# Patient Record
Sex: MALE | Race: WHITE | Employment: UNEMPLOYED | URBAN - METROPOLITAN AREA
[De-identification: names, ages, dates, MRNs, and addresses within clinical notes are randomized per-mention and may not be internally consistent; named-entity substitution may affect disease eponyms.]

---

## 2021-09-01 ENCOUNTER — HOSPITAL ENCOUNTER (INPATIENT)
Age: 21
LOS: 4 days | Discharge: HOME | DRG: 754 | End: 2021-09-05
Attending: EMERGENCY MEDICINE | Admitting: PSYCHIATRY & NEUROLOGY
Payer: MEDICAID

## 2021-09-01 DIAGNOSIS — F32.A DEPRESSION, UNSPECIFIED DEPRESSION TYPE: Primary | ICD-10-CM

## 2021-09-01 PROBLEM — R45.851 DEPRESSION WITH SUICIDAL IDEATION: Status: ACTIVE | Noted: 2021-09-01

## 2021-09-01 LAB
ACETAMINOPHEN LEVEL: <5 MCG/ML (ref 10–30)
ALBUMIN SERPL-MCNC: 4.8 G/DL (ref 3.5–5.2)
ALP BLD-CCNC: 83 U/L (ref 40–129)
ALT SERPL-CCNC: 20 U/L (ref 0–40)
AMPHETAMINE SCREEN, URINE: NOT DETECTED
ANION GAP SERPL CALCULATED.3IONS-SCNC: 10 MMOL/L (ref 7–16)
AST SERPL-CCNC: 16 U/L (ref 0–39)
BARBITURATE SCREEN URINE: NOT DETECTED
BASOPHILS ABSOLUTE: 0.02 E9/L (ref 0–0.2)
BASOPHILS RELATIVE PERCENT: 0.2 % (ref 0–2)
BENZODIAZEPINE SCREEN, URINE: NOT DETECTED
BILIRUB SERPL-MCNC: 0.6 MG/DL (ref 0–1.2)
BUN BLDV-MCNC: 9 MG/DL (ref 6–20)
CALCIUM SERPL-MCNC: 9.8 MG/DL (ref 8.6–10.2)
CANNABINOID SCREEN URINE: POSITIVE
CHLORIDE BLD-SCNC: 103 MMOL/L (ref 98–107)
CO2: 27 MMOL/L (ref 22–29)
COCAINE METABOLITE SCREEN URINE: NOT DETECTED
CREAT SERPL-MCNC: 1.1 MG/DL (ref 0.7–1.2)
EOSINOPHILS ABSOLUTE: 0.05 E9/L (ref 0.05–0.5)
EOSINOPHILS RELATIVE PERCENT: 0.5 % (ref 0–6)
ETHANOL: <10 MG/DL (ref 0–0.08)
FENTANYL SCREEN, URINE: NOT DETECTED
GFR AFRICAN AMERICAN: >60
GFR NON-AFRICAN AMERICAN: >60 ML/MIN/1.73
GLUCOSE BLD-MCNC: 93 MG/DL (ref 74–99)
HCT VFR BLD CALC: 47 % (ref 37–54)
HEMOGLOBIN: 16.2 G/DL (ref 12.5–16.5)
IMMATURE GRANULOCYTES #: 0.03 E9/L
IMMATURE GRANULOCYTES %: 0.3 % (ref 0–5)
INFLUENZA A: NOT DETECTED
INFLUENZA B: NOT DETECTED
LYMPHOCYTES ABSOLUTE: 1.31 E9/L (ref 1.5–4)
LYMPHOCYTES RELATIVE PERCENT: 14.2 % (ref 20–42)
Lab: ABNORMAL
MCH RBC QN AUTO: 30.3 PG (ref 26–35)
MCHC RBC AUTO-ENTMCNC: 34.5 % (ref 32–34.5)
MCV RBC AUTO: 87.9 FL (ref 80–99.9)
METHADONE SCREEN, URINE: NOT DETECTED
MONOCYTES ABSOLUTE: 0.58 E9/L (ref 0.1–0.95)
MONOCYTES RELATIVE PERCENT: 6.3 % (ref 2–12)
NEUTROPHILS ABSOLUTE: 7.23 E9/L (ref 1.8–7.3)
NEUTROPHILS RELATIVE PERCENT: 78.5 % (ref 43–80)
OPIATE SCREEN URINE: NOT DETECTED
OXYCODONE URINE: NOT DETECTED
PDW BLD-RTO: 12.2 FL (ref 11.5–15)
PHENCYCLIDINE SCREEN URINE: NOT DETECTED
PLATELET # BLD: 203 E9/L (ref 130–450)
PMV BLD AUTO: 10.9 FL (ref 7–12)
POTASSIUM SERPL-SCNC: 3.8 MMOL/L (ref 3.5–5)
RBC # BLD: 5.35 E12/L (ref 3.8–5.8)
SALICYLATE, SERUM: <0.3 MG/DL (ref 0–30)
SARS-COV-2 RNA, RT PCR: NOT DETECTED
SODIUM BLD-SCNC: 140 MMOL/L (ref 132–146)
TOTAL PROTEIN: 7.6 G/DL (ref 6.4–8.3)
TRICYCLIC ANTIDEPRESSANTS SCREEN SERUM: NEGATIVE NG/ML
WBC # BLD: 9.2 E9/L (ref 4.5–11.5)

## 2021-09-01 PROCEDURE — 1240000000 HC EMOTIONAL WELLNESS R&B

## 2021-09-01 PROCEDURE — 82077 ASSAY SPEC XCP UR&BREATH IA: CPT

## 2021-09-01 PROCEDURE — 87636 SARSCOV2 & INF A&B AMP PRB: CPT

## 2021-09-01 PROCEDURE — 85025 COMPLETE CBC W/AUTO DIFF WBC: CPT

## 2021-09-01 PROCEDURE — 80053 COMPREHEN METABOLIC PANEL: CPT

## 2021-09-01 PROCEDURE — 80143 DRUG ASSAY ACETAMINOPHEN: CPT

## 2021-09-01 PROCEDURE — 80179 DRUG ASSAY SALICYLATE: CPT

## 2021-09-01 PROCEDURE — 99284 EMERGENCY DEPT VISIT MOD MDM: CPT

## 2021-09-01 PROCEDURE — 80307 DRUG TEST PRSMV CHEM ANLYZR: CPT

## 2021-09-01 PROCEDURE — 93005 ELECTROCARDIOGRAM TRACING: CPT | Performed by: EMERGENCY MEDICINE

## 2021-09-01 RX ORDER — ACETAMINOPHEN 325 MG/1
650 TABLET ORAL EVERY 6 HOURS PRN
Status: DISCONTINUED | OUTPATIENT
Start: 2021-09-01 | End: 2021-09-05 | Stop reason: HOSPADM

## 2021-09-01 RX ORDER — NICOTINE 21 MG/24HR
1 PATCH, TRANSDERMAL 24 HOURS TRANSDERMAL DAILY
Status: DISCONTINUED | OUTPATIENT
Start: 2021-09-01 | End: 2021-09-05 | Stop reason: HOSPADM

## 2021-09-01 RX ORDER — HYDROXYZINE PAMOATE 50 MG/1
50 CAPSULE ORAL 3 TIMES DAILY PRN
Status: DISCONTINUED | OUTPATIENT
Start: 2021-09-01 | End: 2021-09-05 | Stop reason: HOSPADM

## 2021-09-01 RX ORDER — MAGNESIUM HYDROXIDE/ALUMINUM HYDROXICE/SIMETHICONE 120; 1200; 1200 MG/30ML; MG/30ML; MG/30ML
30 SUSPENSION ORAL PRN
Status: DISCONTINUED | OUTPATIENT
Start: 2021-09-01 | End: 2021-09-05 | Stop reason: HOSPADM

## 2021-09-01 RX ORDER — HALOPERIDOL 5 MG
5 TABLET ORAL EVERY 6 HOURS PRN
Status: DISCONTINUED | OUTPATIENT
Start: 2021-09-01 | End: 2021-09-05 | Stop reason: HOSPADM

## 2021-09-01 RX ORDER — HALOPERIDOL 5 MG/ML
5 INJECTION INTRAMUSCULAR EVERY 6 HOURS PRN
Status: DISCONTINUED | OUTPATIENT
Start: 2021-09-01 | End: 2021-09-05 | Stop reason: HOSPADM

## 2021-09-01 RX ORDER — TRAZODONE HYDROCHLORIDE 50 MG/1
50 TABLET ORAL NIGHTLY PRN
Status: DISCONTINUED | OUTPATIENT
Start: 2021-09-01 | End: 2021-09-05 | Stop reason: HOSPADM

## 2021-09-01 ASSESSMENT — PATIENT HEALTH QUESTIONNAIRE - PHQ9
SUM OF ALL RESPONSES TO PHQ QUESTIONS 1-9: 9
SUM OF ALL RESPONSES TO PHQ QUESTIONS 1-9: 17

## 2021-09-01 ASSESSMENT — LIFESTYLE VARIABLES: HISTORY_ALCOHOL_USE: NO

## 2021-09-01 ASSESSMENT — SLEEP AND FATIGUE QUESTIONNAIRES
DO YOU USE A SLEEP AID: COMMENT
AVERAGE NUMBER OF SLEEP HOURS: 7
DO YOU HAVE DIFFICULTY SLEEPING: COMMENT

## 2021-09-01 NOTE — ED PROVIDER NOTES
HPI: Lorena Carter 23 y.o. male presents with a complaint of psychiatric evaluation. Patient complains of depression. ,  States increasing depression as he recently lost his job, his mother  2 years ago when he was the one to discover her, lost his grandmother earlier this year who was his last close family member. States he had a child born earlier this year and he is not able to see her. Made statements on social media and testing his friends about not wanting to be here anymore and suicidal thoughts. At this time he states \"I does not want to be here anymore\" but denies suicidal homicidal ideation. Denies visual auditory hallucinations. Uses cannabis but denies other alcohol or illicit drug use. Not presently enrolled in any form of mental health therapy. No family history of suicide.      --------------------------------------------- PAST HISTORY ---------------------------------------------  Past Medical History:  has no past medical history on file. Past Surgical History:  has no past surgical history on file. Social History:  reports that he has never smoked. He has never used smokeless tobacco. He reports that he does not drink alcohol and does not use drugs. Family History: family history is not on file. The patients home medications have been reviewed. Allergies: Patient has no known allergies.     -------------------------------------------------- RESULTS -------------------------------------------------    LABS:  Results for orders placed or performed during the hospital encounter of 21   COVID-19 & Influenza Combo    Specimen: Nasopharyngeal Swab   Result Value Ref Range    SARS-CoV-2 RNA, RT PCR NOT DETECTED NOT DETECTED    INFLUENZA A NOT DETECTED NOT DETECTED    INFLUENZA B NOT DETECTED NOT DETECTED   CBC Auto Differential   Result Value Ref Range    WBC 9.2 4.5 - 11.5 E9/L    RBC 5.35 3.80 - 5.80 E12/L    Hemoglobin 16.2 12.5 - 16.5 g/dL    Hematocrit 47.0 37.0 - 54.0 Range    Ethanol Lvl <10 mg/dL    Acetaminophen Level <5.0 (L) 10.0 - 11.9 mcg/mL    Salicylate, Serum <9.9 0.0 - 30.0 mg/dL    TCA Scrn NEGATIVE Cutoff:300 ng/mL       RADIOLOGY:  Interpreted by Radiologist.  No orders to display       EKG: This EKG is signed and interpreted by the EP. Rate: 56  Rhythm: Sinus  Interpretation: Sinus rhythm sinus bradycardia. MO is 132, QRS is 9 0, QTc is 405. No other acute findings no prior for comparison  Comparison: None    --------------------------------------------- PAST HISTORY ---------------------------------------------  Past Medical History:  has no past medical history on file. Past Surgical History:  has no past surgical history on file. Social History:  reports that he has never smoked. He has never used smokeless tobacco. He reports that he does not drink alcohol and does not use drugs. Family History: family history is not on file. The patients home medications have been reviewed. Allergies: Patient has no known allergies. ROS: 10 point review of systems was performed and the pertinent positives and negatives are documented in the history of present illness. ROS negative otherwise      ------------------------- NURSING NOTES AND VITALS REVIEWED ---------------------------   The nursing notes within the ED encounter and vital signs as below have been reviewed.    /77   Pulse 71   Temp 97.5 °F (36.4 °C) (Oral)   Resp 19   SpO2 100%   Oxygen Saturation Interpretation: Normal        ---------------------------------------------------PHYSICAL EXAM--------------------------------------      Constitutional/General: Alert and oriented x3,   Head: NC/AT  Eyes: PERRL, EOMI  Mouth: Oropharynx clear, handling secretions, no trismus  Neck: Supple, full ROM, non tender to palpation in the midline, no stridor, no crepitus, no meningeal signs  Pulmonary: Lungs clear to auscultation bilaterally,   Not in respiratory distress  Cardiovascular: Regular rate and rhythm,   2+ distal pulses  Abdomen: Soft, non tender, non distended, +BS, no  No pulsatile masses appreciated  Extremities: Moves all extremities x 4. Warm and well perfused, . Capillary refill <3 seconds  Skin: warm and dry without rash  Neurologic: GCS 15, CN 2-12 grossly intact,   Psych: calm Affect      ------------------------------------------ PROGRESS NOTES ------------------------------------------     Consultations:   Social work     Re-Evaluations:        Counseling: The emergency provider has spoken with thepatient and discussed todays results, in addition to providing specific details for the plan of care and counseling regarding the diagnosis and prognosis. Questions are answered at this time and they are agreeable with the plan.      --------------------------------- ADDITIONAL PROVIDER NOTES ---------------------------------     This patient's ED course included: a personal history and physicial eaxmination, multiple bedside re-evaluations, IV medications, cardiac monitoring, continuous pulse oximetry and complex medical decision making and emergency management    This patient has been closely monitored during their ED course.     --------------------------------- IMPRESSION AND DISPOSITION ---------------------------------    IMPRESSION  1. Depression, unspecified depression type        DISPOSITION  Disposition: as per consultation - medically clear for admission to psychiatric facility  Patient condition is stable    [unfilled]     Please note this note was transcribed using voice recognition software.  Every effort was to ensure accuracy however inadvertent transcription errors may be present       Tracey Galaviz DO  09/01/21 9933

## 2021-09-02 PROBLEM — F32.9 MAJOR DEPRESSIVE DISORDER, SINGLE EPISODE WITH MIXED FEATURES: Status: ACTIVE | Noted: 2021-09-02

## 2021-09-02 LAB
CHOLESTEROL, TOTAL: 130 MG/DL (ref 0–199)
EKG ATRIAL RATE: 56 BPM
EKG P AXIS: 46 DEGREES
EKG P-R INTERVAL: 132 MS
EKG Q-T INTERVAL: 420 MS
EKG QRS DURATION: 90 MS
EKG QTC CALCULATION (BAZETT): 405 MS
EKG R AXIS: 81 DEGREES
EKG T AXIS: 68 DEGREES
EKG VENTRICULAR RATE: 56 BPM
HBA1C MFR BLD: 5 % (ref 4–5.6)
HDLC SERPL-MCNC: 48 MG/DL
LDL CHOLESTEROL CALCULATED: 67 MG/DL (ref 0–99)
TRIGL SERPL-MCNC: 77 MG/DL (ref 0–149)
VLDLC SERPL CALC-MCNC: 15 MG/DL

## 2021-09-02 PROCEDURE — 83036 HEMOGLOBIN GLYCOSYLATED A1C: CPT

## 2021-09-02 PROCEDURE — 99222 1ST HOSP IP/OBS MODERATE 55: CPT | Performed by: NURSE PRACTITIONER

## 2021-09-02 PROCEDURE — 1240000000 HC EMOTIONAL WELLNESS R&B

## 2021-09-02 PROCEDURE — 36415 COLL VENOUS BLD VENIPUNCTURE: CPT

## 2021-09-02 PROCEDURE — 80061 LIPID PANEL: CPT

## 2021-09-02 RX ORDER — MECOBALAMIN 5000 MCG
5 TABLET,DISINTEGRATING ORAL NIGHTLY
Status: DISCONTINUED | OUTPATIENT
Start: 2021-09-02 | End: 2021-09-05 | Stop reason: HOSPADM

## 2021-09-02 RX ORDER — CITALOPRAM 20 MG/1
10 TABLET ORAL DAILY
Status: DISCONTINUED | OUTPATIENT
Start: 2021-09-02 | End: 2021-09-05 | Stop reason: HOSPADM

## 2021-09-02 RX ORDER — OXCARBAZEPINE 300 MG/1
150 TABLET, FILM COATED ORAL 2 TIMES DAILY
Status: DISCONTINUED | OUTPATIENT
Start: 2021-09-02 | End: 2021-09-05 | Stop reason: HOSPADM

## 2021-09-02 ASSESSMENT — SLEEP AND FATIGUE QUESTIONNAIRES
DO YOU USE A SLEEP AID: YES
DIFFICULTY ARISING: NO
DIFFICULTY FALLING ASLEEP: YES
RESTFUL SLEEP: YES
SLEEP PATTERN: DIFFICULTY FALLING ASLEEP;RESTLESSNESS
DIFFICULTY STAYING ASLEEP: YES
DO YOU HAVE DIFFICULTY SLEEPING: YES
AVERAGE NUMBER OF SLEEP HOURS: 7

## 2021-09-02 ASSESSMENT — LIFESTYLE VARIABLES: HISTORY_ALCOHOL_USE: NO

## 2021-09-02 ASSESSMENT — PAIN SCALES - GENERAL
PAINLEVEL_OUTOF10: 0
PAINLEVEL_OUTOF10: 0

## 2021-09-02 ASSESSMENT — PATIENT HEALTH QUESTIONNAIRE - PHQ9: SUM OF ALL RESPONSES TO PHQ QUESTIONS 1-9: 9

## 2021-09-02 NOTE — PLAN OF CARE
Problem: Depressive Behavior With or Without Suicide Precautions:  Goal: Able to verbalize acceptance of life and situations over which he or she has no control  Description: Able to verbalize acceptance of life and situations over which he or she has no control  Outcome: Ongoing     Problem: Depressive Behavior With or Without Suicide Precautions:  Goal: Able to verbalize and/or display a decrease in depressive symptoms  Description: Able to verbalize and/or display a decrease in depressive symptoms  Outcome: Not Met This Shift     Problem: Depressive Behavior With or Without Suicide Precautions:  Goal: Ability to disclose and discuss suicidal ideas will improve  Description: Ability to disclose and discuss suicidal ideas will improve  Outcome: Ongoing     Problem: Depressive Behavior With or Without Suicide Precautions:  Goal: Able to verbalize support systems  Description: Able to verbalize support systems  Outcome: Ongoing     Problem: Depressive Behavior With or Without Suicide Precautions:  Goal: Absence of self-harm  Description: Absence of self-harm  Outcome: Met This Shift     Problem: Depressive Behavior With or Without Suicide Precautions:  Goal: Patient specific goal  Description: Patient specific goal  Outcome: Ongoing     Problem: Depressive Behavior With or Without Suicide Precautions:  Goal: Participates in care planning  Description: Participates in care planning  Outcome: Ongoing

## 2021-09-02 NOTE — ED NOTES
[id number] [address] Poole for admission to the psych unit per Dr. Taovn Ring.       Gisela Napier RN  09/01/21 5676
Bed: [facility]  Expected date:   Expected time:   Means of arrival:   Comments:  Zeke Martinez RN  09/01/21 3331
Pt belongings, 1 bag in [address]  09/01/21 1053
Pt very upset that he came in willingly and now he is going to have to stay. I explained the process and he keeps stating that he wants to leave. Sitting on the side of the bed with family at bedside.       Dossie Kocher, RN  09/01/21 6579
Report given to the floor. No further questions.       Maria Victoria Adair RN  09/01/21 2026
Hallucinations/Delusions   [] Reports:   [x] Denies     Substance Use/Alcohol Use/Addiction: SBIRT Screen Complete.    [x] Reports: Prescribed medical marijuana  [] Denies     Trauma History  [] Reports:  [x] Denies     Collateral Information:       Level of Care/Disposition Plan  [] Home:   [] Outpatient Provider:   [] Crisis Unit:   [x] Inpatient Psychiatric Unit:  [] Other:        LEXIE Rdz, [place]  09/01/21 1359

## 2021-09-02 NOTE — GROUP NOTE
Group Therapy Note    Date: 9/2/2021    Group Start Time: 1050  Group End Time: 0294  Group Topic: Cognitive Skills    [facility]  [address], LEXIE, FABIOLA        Group Therapy Note    Attendees: 13         Patient's Goal:  Pt will be able to identify how to build happiness and implement the skills into their life. Notes:  Pt participated in group and made connections. Status After Intervention:  Improved    Participation Level:  Active Listener and Interactive    Participation Quality: Appropriate, Attentive and Sharing      Speech:  normal      Thought Process/Content: Logical  Linear      Affective Functioning: Congruent      Mood: anxious      Level of consciousness:  Alert, Oriented x4 and Attentive      Response to Learning: Able to verbalize current knowledge/experience, Able to verbalize/acknowledge new learning, Able to retain information and Capable of insight      Endings: None Reported    Modes of Intervention: Education, Support, Socialization, Exploration, Clarifying and Problem-solving      Discipline Responsible: /Counselor      Signature:  LEXIE Bundy [place]

## 2021-09-02 NOTE — PROGRESS NOTES
Attended morning community meeting. Updated on staffing and daily expectations. Shared goal for the day as to stay positive and keep a good mindset. Recreation assessment completed.

## 2021-09-02 NOTE — PLAN OF CARE
Problem: Depressive Behavior With or Without Suicide Precautions:  Goal: Ability to disclose and discuss suicidal ideas will improve  Description: Ability to disclose and discuss suicidal ideas will improve  9/2/2021 1203 by Thais Meeks RN  Outcome: Met This Shift  Pt. denies suicidal ideations. 9/2/2021 0429 by Ace Christiansen RN  Outcome: Ongoing  9/1/2021 2306 by Harris Bolton RN  Outcome: Ongoing     Problem: Depressive Behavior With or Without Suicide Precautions:  Goal: Absence of self-harm  Description: Absence of self-harm  9/2/2021 1203 by Thais Meeks RN  Outcome: Met This Shift    No self harm. Denies thoughts of self harm. 9/2/2021 0429 by Ace Christiansen RN  Outcome: Met This Shift  9/1/2021 2306 by Harris Bolton RN  Outcome: Met This [address]  Initial Interdisciplinary Treatment Plan NOTE    Review Date & Time: 9/2/21 0900    Patient was not in treatment team    Admission Type:   Admission Type: Involuntary    Reason for admission:  Reason for Admission: texted friend, Carolyn Saavedra, who is paramedic that he needed to talk with somebody . sad and stated he told his friend\" he did not want to be here\" Missing his mother and Grandmother and girlfriend was not available. Estimated Length of Stay Update:   5 days  Estimated Discharge Date Update:  MONDAY    EDUCATION:   Learner Progress Toward Treatment Goals: Reviewed results and recommendations of this team and Reviewed goals and plan of care    Method: Individual    Outcome: Verbalized understanding and Needs reinforcement    PATIENT GOALS: \"STAY POSITIVE AND KEEP IN GOOD MINDSET\"    PLAN/TREATMENT RECOMMENDATIONS UPDATE: INITIATE groups and medications as needed, assess suicide risk, supportive care, TMS consult, collateral info, discharge planning and follow up    GOALS UPDATE:   Time frame for Short-Term Goals: 5 days    Pt. Is up on unit, attends groups, requesting [facility] consult.  Denies suicidal ideations and self harm. Pt. denies hallucinations. No voiced delusions. n control. No unit problems.     Ayaan Ocampo RN

## 2021-09-02 NOTE — CARE COORDINATION
Biopsychosocial Assessment Note    Social work met with patient to complete the biopsychosocial assessment and CSSR-S. Mental Status Exam: Pt is alert and oriented x4. Pt's mood is sad, affect is brightened. Pt is calm and cooperative. Pt's thoughts are logical. Pt's speech is clear, rate and volume is normal. Pt's eye contact is good. Pt denies any legal history, admits to the use of marijuana, denies the use of other DOA. Pt denies any suicide attempts, pt denied SI, HI, AVH. Pt denied a past history of abuse. Chief Complaint: Per ED SW note \"Pt is a 24 yo male who presents via ambulance, pt is on a pink slip, made remarks on [employer] social media that he did not want to live any more. Reports his mother  2 years ago and he was the one who found her. Reports extreme amount of guilt because he worked long hours and feels if he has been at home more he light have been able to prevent her death. Reports in 2021 grandmother  and in 2021 his ex-girlfriend cut him off from visitation with his infant daughter. Also reports he lost his job some time ago and has not been able to hold down another during one past year. Reports earlier today he was talking with friends via [employer] and made remarks that he did 'not want to be here anymore\" and that he was having suicidal thoughts. Friends called police and pt was brought to ED. Pt continues to voice passive suicidal ideation but denies active ideation intent or plan. Denies hx of previous attempts. \"      Patient Report: Pt stated that he is currently here because he told one of his friends that he was upset due to the death of his mother and grandmother and he said that he didn't want to wake up due to missing them. Pt then stated that his friend called the  and the  came to his home and brought him here.  Pt is currently going to the counseling center in [place] but would like to be opened with [facility] [place] or [facility] therapy which involves shock waves administered through the head. Pt stated that he has a  daughter but he is unsure if the daughter is his and he has a test in the end of September to determine this. The child is with the mother. Pt reports living with his Gael Gamez. Pt admits to smoking marijuana daily, starting 1 year ago when he was 22 and having a medical marijuana card. Pt enjoys going to car shows and spending time with his friends that are also involved in the car community. Gender  [x] Male [] Female [] Transgender  [] Other    Sexual Orientation    [x] Heterosexual [] Homosexual [] Bisexual [] Other    Suicidal Ideation  [] Past [] Present [x] Denies     Homicidal Ideation  [] Past [] Present [x] Denies     Hallucinations/Delusions (Specify type)  [] Reports [x] Denies     Substance Use/Alcohol Use/Addiction  [x] Reports [] Denies     Tobacco Use (within the last 6 months)  [x] Reports [] Denies     Trauma History  [] Reports [x] Denies     Collateral Contact (CASS signed)  Name: Chapo Castro  Relationship: Girlfriend  Number: [phone]    Collateral Information: SW called Krysten Kam for collateral. Krysten Kam stated that she was with the pt an hour before it happened. Krysten Kam was not home when the pt called and had his friends come over. When pt's friends got to the house the pt broke down and that is when the friend called the . The pt was suppose to go to work but ended up coming to the hospital before work. Pt is living with his girlfriend and his girlfriend will be the one picking him up at DC. S/O has no concerns for pt's DC and there are no weapons in the home.         Access to Weapons per Collateral Contact: [] Reports [x] Denies       Follow up provider preference: [facility], psychiatrist is Tamara for discharge  Location (where do they plan on discharging to?): Home with GF    Transportation (who will pick them up at discharge?) GF    Medications (will they have money for copays at discharge?): Unsure if insurance doesn't cover, pt is currently unemployed

## 2021-09-02 NOTE — PROGRESS NOTES
[facility]  Admission Note     Admission Type:   Admission Type: Involuntary    Reason for admission:  Reason for Admission: texted friend, Faisal Colindres, who is paramedic that he needed to talk with somebody . sad and stated he told his friend\" he did not want to be here\" Missing his mother and Grandmother and girlfriend was not available. 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 No pertinent past medical history. Status EXAM:  Status and Exam  Normal: No  Facial Expression: Brightened  Affect: Congruent  Level of Consciousness: Alert  Mood:Normal: No  Mood: Depressed, Sad  Motor Activity:Normal: No  Motor Activity: Decreased  Interview Behavior: Cooperative  Preception: [place] to Person, [place] Kid to Time, [place] to Place, [place] to Situation  Attention:Normal: Yes  Attention:  (able to focus)  Thought Processes: Circumstantial  Thought Content:Normal: Yes  Hallucinations: None  Delusions: No  Memory:Normal: Yes  Insight and Judgment: Yes  Present Suicidal Ideation: Other(See comment) (states is depressed the last 2 years since mother passed.)  Present Homicidal Ideation: No    Tobacco Screening:  Practical Counseling, on admission, imer X, if applicable and completed (first 3 are required if patient doesn't refuse):             (x )  Recognizing danger situations (included triggers and roadblocks)                    ([facility] skills (new ways to manage stress, exercise, relaxation techniques, changing routine, distraction)                                                           ( x  Basic information about quitting (benefits of quitting, techniques in how to quit, available resources  ( x) Referral for counseling faxed to [facility]                                           ( ) Patient refused counseling  ( ) Patient has not smoked in the last 30 days    Metabolic Screening:    No results found for: LABA1C    No results found for: CHOL  No results found for: TRIG  No results found for: HDL  No components found for: LDLCAL  No results found for: LABVLDL      Body mass index is 25.82 kg/m². BP Readings from Last 2 Encounters:   09/01/21 110/70           Pt admitted with followings belongings:  Dentures: None  Vision - Corrective Lenses: None  Hearing Aid: None  Jewelry: None  Body Piercings Removed: No  Clothing: Footwear, Pants, Shirt (blue tennis shoes, green baseball cap, black hoodie, light brown pants, grey t-shirt)  Were All Patient Medications Collected?: Not Applicable  Other Valuables: None     Patient's home medications were not brought. Patient oriented to surroundings and program expectations and copy of patient rights given. Received admission packet: and PIn   Consents reviewed, signed except for the voluntary Patient verbalize understanding: of policies. Patient education on precautions:suicide and every 15 min observations. contracts for safety.                  Sobia España RN

## 2021-09-02 NOTE — H&P
Department of Psychiatry  History and Physical - Adult     CHIEF COMPLAINT:  \" I asked my friend to come over because I was having a bad day and he misunderstood what I said. \"    Patient was seen after discussing with the treatment team and reviewing the chart    CIRCUMSTANCES OF ADMISSION: presented to ED via EMS pink slip by police after he made remarks on staff chat that he not want to live any longer. HISTORY OF PRESENT ILLNESS:      The patient is a 23 y.o. male with no significant past medical history presented to ED via EMS pink slip by police after he made remarks on staff chat that he not want to live any longer. In ED report his mother  2 years ago and he found her reports guilt that he because he works long hours and feels that if he had been at home he would have been able to prevent her death. He reports unable to thousand 21 his grandmother  in 2020 his ex-girlfriend cut him off from visitation with his infant daughter. House reports he lost his job and has not been able to find another job since. In the ED continue to have passive suicidal ideation but admits that he has been making multiple suicidal statements unsnapped. In the ED his urine drug is positive for cannabis his blood alcohol level is negative he is medically cleared admitted to [address] [place] [address] adult psychiatric unit for further psychiatric assessment stabilization and treatment. Upon evaluation today patient is minimizing the circumstances hospitalization. He states that he woke up in the morning and that in the past he would call his mother and grandmother in the morning but states that since they have both are  he was not able to call either of them so he messaged a friend on staff Saying he was having a bad day. He states the person he message on snap chat is an EMT and he told the EMT \"I just want to be here anymore. \"  He states that the EMT felt that he needed to get him psychiatric evaluation because he has seen people who have committed suicide in the past and wanted to get him checked out. He states that everything was taken out of context and he was just [place] a bad day. \"  He vehemently denies any SI/HI intent or plan he denies any auditory or visual hallucinations. He states that he belongs to a new car club and that when he came to the hospital they were following the ambulance and he felt very supported by all these people. He states that he does see a counselor but that he has like talking on his problems he rather just hang out with friends that makes him feel better. He denies any manic symptoms he denies any psychotic symptoms he denies being depressed or anxious he states he was \"just having a bad day\". Past psychiatric history: Patient denies any history of any inpatient psychiatric treatment. He states he treats outpatient at the [facility] and sees nurse practitioner Su Wright. He states she prescribes him Seroquel as needed at bedtime for sleep he states he also threw a cold floor but that he does not enjoy talking about his problems. He states he also has a service dog. He states he is been diagnosed with PTSD in the past from finding his mother passed away. He denies ever attempting suicide or cutting. He has been with the family is any major mental illness denies a when the family committed suicide    Legal history: Patient denies any legal history    Substance history: Patient states he smokes marijuana has his medical marijuana card. His urine drug screen is positive for cannabis    Personal family and social history: Patient states he grew up in [place] raised by his mom states his father left when he was 1 and that he never saw him after that. He states that his mother passed away in 2019 from a heart attack following a Whipple procedure. 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   He states he quit his job after his mother  in 2019. He has never been  he states he has a son with an ex-girlfriend and he is currently going through the court process to prove his paternity use we can have visitation rights. States he currently lives with his new girlfriend of 6 months. He is not currently working he denies any history of physical sex emotional abuse or growing up denies access to any guns. Past Medical History:    History reviewed. No pertinent past medical history. Medications Prior to Admission:   No medications prior to admission. Past Surgical History:        Procedure Laterality Date    FEMUR FRACTURE SURGERY Left     age 8 and removed later        Allergies:   Pcn [penicillins], Flagyl [metronidazole], and Morphine    Family History  History reviewed. No pertinent family history. EXAMINATION:    REVIEW OF SYSTEMS:    ROS:  [x] All negative/unchanged except if checked.  Explain positive(checked items) below:  [] Constitutional  [] Eyes  [] Ear/Nose/Mouth/Throat  [] Respiratory  [] CV  [] GI  []   [] Musculoskeletal  [] Skin/Breast  [] Neurological  [] Endocrine  [] Heme/Lymph  [] Allergic/Immunologic    Explanation:     Vitals:  /60   Pulse 57   Temp 97.7 °F (36.5 °C) (Oral)   Resp 14   Ht 5' 6\" (1.676 m)   Wt 160 lb (72.6 kg)   SpO2 100%   BMI 25.82 kg/m²      Physical Examination:   Head: x  Atraumatic: x normocephalic  Skin and Mucosa        Moist x  Dry   Pale  x Normal   Neck:  Thyroid  Palpable   x  Not palpable   venus distention   adenopathy   Chest: x Clear   Rhonchi     Wheezing   CV:  xS1   xS2    xNo murmer   Abdomen:  x  Soft    Tender    Viceromegaly   Extremities:  x No Edema     Edema     Cranial Nerves Examination:   CN II:   xPupils are reactive to light  Pupils are non reactive to light  CN III, IV, VI:  xNo eye deviation    No diplopia or ptosis   CN V:    xFacial Sensation is intact     Facial Sensation is not intact   CN IIIV:   x Hearing is normal to rubbing fingers   CN IX, X:     xNormal gag reflex and phonation   CN XI:   xShoulder shrug and neck rotation is normal  CNXII:    xTongue is midline no deviation or atrophy    Mental Status Examination:    Level of consciousness:  within normal limits   Appearance:  well-appearing  Behavior/Motor:  no abnormalities noted  Attitude toward examiner:  cooperative  Speech:  spontaneous, normal rate and normal volume   Mood: \" I feel fine. \"  Affect: Mood congruent appropriate pleasant  Thought processes: Linear without flight of ideas loose associations  Thought content: Devoid of any auditory visual hallucinations delusions during the perceptual abnormalities denies SI/HI intent or plan  Cognition:  oriented to person, place, and time   Concentration intact  Memory intact  [address] of Knowledge adequate      DIAGNOSIS:  Major depressive disorder with mixed features          LABS: REVIEWED TODAY:  Recent Labs     09/01/21  1512   WBC 9.2   HGB 16.2        Recent Labs     09/01/21  1512      K 3.8      CO2 27   BUN 9   CREATININE 1.1   GLUCOSE 93     Recent Labs     09/01/21  1512   BILITOT 0.6   ALKPHOS 83   AST 16   ALT 20     Lab Results   Component Value Date    LABAMPH NOT DETECTED 09/01/2021    BARBSCNU NOT DETECTED 09/01/2021    LABBENZ NOT DETECTED 09/01/2021    LABMETH NOT DETECTED 09/01/2021    OPIATESCREENURINE NOT DETECTED 09/01/2021    PHENCYCLIDINESCREENURINE NOT DETECTED 09/01/2021    ETOH <10 09/01/2021     No results found for: TSH, FREET4  No results found for: LITHIUM  No results found for: VALPROATE, CBMZ  No results found for: LITHIUM, VALPROATE      Radiology No results found. TREATMENT PLAN:    Risk Management: Based on the diagnosis and assessment biopsychosocial treatment model was presented to the patient and was given the opportunity to ask any question.   The patient was agreeable to the plan and all the patient's questions were answered to the patient's satisfaction. I discussed with the patient the risk, benefit, alternative and common side effects for the proposed medication treatment. The patient is consenting to this treatment. Collateral Information:  Will obtain collateral information from the family or friends. Will obtain medical records as appropriate from out patient providers  Will consult the hospitalist for a physical exam to rule out any co-morbid physical condition. Patient's diagnosis, treatment plan, medication management was formulated at the end of evaluation and after reviewing relevant documentation. Patient was seen directly by myself and Dr. Ramonita Ham 10 mg daily for depression  Trileptal 150 mg twice daily for mood stabilization      Prn Haldol 5mg and Vistaril 50mg q6hr for extreme agitation. Trazodone as ordered for insomnia  Vistaril as ordered for anxiety      Psychotherapy:   Encourage participation in milieu and group therapy  Individual therapy as needed              Behavioral Services  Medicare Certification Upon Admission    I certify that this patient's inpatient psychiatric hospital admission is medically necessary for:    [x] (1) Treatment which could reasonably be expected to improve this patient's condition,       [] (2) Or for diagnostic study;     AND     [x](2) The inpatient psychiatric services are provided while the individual is under the care of a physician and are included in the individualized plan of care.     Estimated length of stay/service 3 to 7 days based on stability    Plan for post-hospital care outpatient psychiatric and counseling services    Electronically signed by AILX Jones CNP on 6/6/8693 at 8:00 AM        Electronically signed by ALIX Jones CNP on 6/4/5497 at 7:59 AM

## 2021-09-02 NOTE — PROGRESS NOTES
Attended afternoon meet and greet. Updated on staffing and evening expectations. Participated in afternoon lifestories trivia.

## 2021-09-03 PROCEDURE — 99232 SBSQ HOSP IP/OBS MODERATE 35: CPT | Performed by: NURSE PRACTITIONER

## 2021-09-03 PROCEDURE — 6370000000 HC RX 637 (ALT 250 FOR IP): Performed by: NURSE PRACTITIONER

## 2021-09-03 PROCEDURE — 1240000000 HC EMOTIONAL WELLNESS R&B

## 2021-09-03 RX ADMIN — CITALOPRAM HYDROBROMIDE 10 MG: 20 TABLET ORAL at 09:30

## 2021-09-03 RX ADMIN — OXCARBAZEPINE 150 MG: 300 TABLET, FILM COATED ORAL at 21:15

## 2021-09-03 RX ADMIN — Medication 5 MG: at 21:15

## 2021-09-03 RX ADMIN — OXCARBAZEPINE 150 MG: 300 TABLET, FILM COATED ORAL at 09:30

## 2021-09-03 ASSESSMENT — PAIN SCALES - GENERAL: PAINLEVEL_OUTOF10: 0

## 2021-09-03 NOTE — GROUP NOTE
Group Therapy Note    Date: 9/3/2021    Group Start Time: 1100  Group End Time: 1120  Group Topic: Cognitive Skills    [facility]    LEXIE Bailey, FABIOLA        Group Therapy Note    Attendees: 11         Patient's Goal:  To participate in the group discussion on positive psychology. Notes:  Pt was an active participant. Status After Intervention:  Improved    Participation Level:  Active Listener and Interactive    Participation Quality: Appropriate, Attentive, Sharing and Supportive      Speech:  normal      Thought Process/Content: Logical      Affective Functioning: Congruent      Mood: anxious      Level of consciousness:  Alert and Oriented x4      Response to Learning: Able to verbalize current knowledge/experience      Endings: None Reported    Modes of Intervention: Education, Support, Socialization, Exploration, Clarifying and Problem-solving      Discipline Responsible: /Counselor      Signature:  LEXIE Huffman, FABIOLA

## 2021-09-03 NOTE — GROUP NOTE
Group Therapy Note    Date: 9/3/2021    Group Start Time: 1000  Group End Time: 1030  Group Topic: Psychoeducation    [facility]    Ellen López, CTRS        Group Therapy Note    Number of participants: 11  Type of group: Psychoeducation  Mode of intervention: Education, Support, Socialization, Exploration, Clarifying, and Problem-solving  Topic: Mental Health Maintenance Plan  Objective: Pt will develop and share 1 way to implement maintenance plan in recovery. Notes:  Pt was interactive during group developing and sharing 1 way to implement maintenance plan in recovery. Pt gave support and feedback to others. Status After Intervention:  Improved    Participation Level:  Active Listener and Interactive    Participation Quality: Appropriate, Attentive, Sharing and Supportive      Speech:  normal      Thought Process/Content: Logical      Affective Functioning: Congruent      Mood: euthymic      Level of consciousness:  Alert, Oriented x4 and Attentive      Response to Learning: Able to verbalize current knowledge/experience, Able to verbalize/acknowledge new learning, Able to retain information, Capable of insight, Able to change behavior and Progressing to goal      Endings: None Reported    Modes of Intervention: Education, Support, Socialization, Exploration, Clarifying and Problem-solving

## 2021-09-03 NOTE — PLAN OF CARE
Denies SI/HI   denies hallucinations  mood is anxious  pleasant on approach  attended treatment team  stating there is nothing wrong with him  denies that he was suicidal  denies that he ever said he was suicidal     initally refusing to take meds stating he doesn't need them   discharge focused and became irritable when told he was not discharged today began to cry and rock in chair   eventually calmed and agreed to take meds    encouraged to attend groups  cooperative with a.m. meds  will continue to monitor

## 2021-09-03 NOTE — PLAN OF CARE
[address] [place] [address]  Day 3 Interdisciplinary Treatment Plan NOTE    Review Date & Time: 9/3/21 1000    Patient was in treatment team    Estimated Length of Stay Update:  3-5 days  Estimated Discharge Date Update: 5-7 days    EDUCATION:   Learner Progress Toward Treatment Goals: Reviewed results and recommendations of this team    Method: Group    Outcome: Verbalized understanding    PATIENT GOALS: \"no'    PLAN/TREATMENT RECOMMENDATIONS UPDATE: day 7    GOALS UPDATE:   Time frame for Short-Term Goals: 3-5 days      Yuly Hawkins RN

## 2021-09-03 NOTE — PROGRESS NOTES
BEHAVIORAL HEALTH FOLLOW-UP NOTE     9/3/2021     Patient was seen and examined in person, Chart reviewed   Patient's case discussed with staff/team    Chief Complaint: \"I am doing okay. \"    Interim History: Patient up on the unit he vehemently denies SI/HI intent or plan he denies any auditory visualizations. He states his been eating well and sleeping well there are no neurovegetative signs of depression. He has been attending groups social with peers on the unit. There are no overt overt signs psychosis      Appetite:   [x] Normal/Unchanged  [] Increased  [] Decreased      Sleep:       [x] Normal/Unchanged  [] Fair       [] Poor              Energy:    [x] Normal/Unchanged  [] Increased  [] Decreased        SI [] Present  [x] Absent    HI  []Present  [x] Absent     Aggression:  [] yes  [x] no    Patient is [x] able  [] unable to CONTRACT FOR SAFETY     PAST MEDICAL/PSYCHIATRIC HISTORY:   History reviewed. No pertinent past medical history. FAMILY/SOCIAL HISTORY:  History reviewed. No pertinent family history.   Social History     Socioeconomic History    Marital status: Single     Spouse name: Not on file    Number of children: Not on file    Years of education: 15    Highest education level: Not on file   Occupational History    Not on file   Tobacco Use    Smoking status: Current Every Day Smoker     Packs/day: 0.50    Smokeless tobacco: Never Used   Vaping Use    Vaping Use: Every day   Substance and Sexual Activity    Alcohol use: Not Currently    Drug use: Yes     Types: Marijuana    Sexual activity: Yes     Partners: Female   Other Topics Concern    Not on file   Social History Narrative    Not on file     Social Determinants of Health     Financial Resource Strain:     Difficulty of Paying Living Expenses:    Food Insecurity:     Worried About Running Out of Food in the Last Year:     [address] in the Last Year:    Transportation Needs:     Lack of Transportation (Medical):    Daniela Gordon Risks, benefits, side effects, drug-to-drug interactions and alternatives to treatment were discussed. Collateral information:   CD evaluation  Encourage patient to attend group and other milieu activities.   Discharge planning discussed with the patient and treatment team.    PSYCHOTHERAPY/COUNSELING:  [x] Therapeutic interview  [x] Supportive  [] CBT  [] Ongoing  [] Other    [x] Patient continues to need, on a daily basis, active treatment furnished directly by or requiring the supervision of inpatient psychiatric personnel      Anticipated Length of stay: 3 to 7 days based on stability            Electronically signed by ALIX Black CNP on 1/2/7242 at 4:55 PM

## 2021-09-04 PROCEDURE — 99231 SBSQ HOSP IP/OBS SF/LOW 25: CPT | Performed by: NURSE PRACTITIONER

## 2021-09-04 PROCEDURE — 6370000000 HC RX 637 (ALT 250 FOR IP): Performed by: NURSE PRACTITIONER

## 2021-09-04 PROCEDURE — 1240000000 HC EMOTIONAL WELLNESS R&B

## 2021-09-04 RX ADMIN — Medication 5 MG: at 22:25

## 2021-09-04 RX ADMIN — CITALOPRAM HYDROBROMIDE 10 MG: 20 TABLET ORAL at 09:27

## 2021-09-04 RX ADMIN — OXCARBAZEPINE 150 MG: 300 TABLET, FILM COATED ORAL at 22:25

## 2021-09-04 RX ADMIN — OXCARBAZEPINE 150 MG: 300 TABLET, FILM COATED ORAL at 09:27

## 2021-09-04 NOTE — PROGRESS NOTES
BEHAVIORAL HEALTH FOLLOW-UP NOTE     9/4/2021     Patient was seen and examined in person, Chart reviewed   Patient's case discussed with staff/team    Chief Complaint: \"I feel so much better. \"    Interim History:   Patient observed in the day room, he is pleasant polite appropriate future focused goal oriented. Denies suicidal or homicidal ideation intent or plan. Devoid of auditory or visual hallucinations. He is present in the milieu, social with peers taking medications attending groups eating and sleeping well. He states that he is appreciative of the help that he is received here    Appetite:  [x] Normal/Unchanged  [] Increased  [] Decreased      Sleep:       [x] Normal/Unchanged  [] Fair       [] Poor              Energy:    [x] Normal/Unchanged  [] Increased  [] Decreased        SI [] Present  [x] Absent    HI  []Present  [x] Absent     Aggression:  [] yes  [x] no    Patient is [x] able  [] unable to CONTRACT FOR SAFETY     PAST MEDICAL/PSYCHIATRIC HISTORY:   History reviewed. No pertinent past medical history. FAMILY/SOCIAL HISTORY:  History reviewed. No pertinent family history.   Social History     Socioeconomic History    Marital status: Single     Spouse name: Not on file    Number of children: Not on file    Years of education: 15    Highest education level: Not on file   Occupational History    Not on file   Tobacco Use    Smoking status: Current Every Day Smoker     Packs/day: 0.50    Smokeless tobacco: Never Used   Vaping Use    Vaping Use: Every day   Substance and Sexual Activity    Alcohol use: Not Currently    Drug use: Yes     Types: Marijuana    Sexual activity: Yes     Partners: Female   Other Topics Concern    Not on file   Social History Narrative    Not on file     Social Determinants of Health     Financial Resource Strain:     Difficulty of Paying Living Expenses:    Food Insecurity:     Worried About Running Out of Food in the Last Year:     [address] in the Last Year:    Transportation Needs:     Lack of Transportation (Medical):  Lack of Transportation (Non-Medical):    Physical Activity:     Days of Exercise per Week:     Minutes of Exercise per Session:    Stress:     Feeling of Stress :    Social Connections:     Frequency of Communication with Friends and Family:     Frequency of Social Gatherings with Friends and Family:     Attends Sikhism Services:     Active Member of Clubs or Organizations:     Attends Club or Organization Meetings:     Marital Status:    Intimate Partner Violence:     Fear of Current or Ex-Partner:     Emotionally Abused:     Physically Abused:     Sexually Abused:            ROS:  [x] All negative/unchanged except if checked.  Explain positive(checked items) below:  [] Constitutional  [] Eyes  [] Ear/Nose/Mouth/Throat  [] Respiratory  [] CV  [] GI  []   [] Musculoskeletal  [] Skin/Breast  [] Neurological  [] Endocrine  [] Heme/Lymph  [] Allergic/Immunologic    Explanation:     MEDICATIONS:    Current Facility-Administered Medications:     citalopram (CELEXA) tablet 10 mg, 10 mg, Oral, Daily, Earnstine Amanda Aiken APRN - CNP, 10 mg at 09/04/21 5807    OXcarbazepine (TRILEPTAL) tablet 150 mg, 150 mg, Oral, BID, Earnstine Amanda Dellick, APRN - CNP, 127 mg at 09/04/21 9596    melatonin disintegrating tablet 5 mg, 5 mg, Oral, Nightly, Earnstine Amanda Dellick, APRN - CNP, 5 mg at 09/03/21 2115    acetaminophen (TYLENOL) tablet 650 mg, 650 mg, Oral, Q6H PRN, Ana Mejia MD    magnesium hydroxide (MILK OF MAGNESIA) 400 MG/5ML suspension 30 mL, 30 mL, Oral, Daily PRN, Ana Mejia MD    nicotine (NICODERM CQ) 21 MG/24HR 1 patch, 1 patch, TransDERmal, Daily, Ana Mejia MD    aluminum & magnesium hydroxide-simethicone (MAALOX) 200-200-20 MG/5ML suspension 30 mL, 30 mL, Oral, PRN, Ana Mejia MD    hydrOXYzine (VISTARIL) capsule 50 mg, 50 mg, Oral, TID PRN, Ana Mejia MD    haloperidol (HALDOL) tablet 5 mg, 5 mg, Oral, Q6H PRN **OR** haloperidol lactate (HALDOL) injection 5 mg, 5 mg, IntraMUSCular, Q6H PRN, Radha Butcher MD    traZODone (DESYREL) tablet 50 mg, 50 mg, Oral, Nightly PRN, Radha Butcher MD      Examination:  /61   Pulse 68   Temp 98.6 °F (37 °C)   Resp 14   Ht 5' 6\" (1.676 m)   Wt 160 lb (72.6 kg)   SpO2 100%   BMI 25.82 kg/m²   Gait - steady  Medication side effects(SE): none reported  Mental Status Examination:    Level of consciousness:  within normal limits   Appearance:  good grooming and good hygiene  Behavior/Motor:  no abnormalities noted  Attitude toward examiner:  cooperative and attentive  Speech:  normal rate, normal volume and well articulated   Mood: euthymic  Affect:  mood congruent  Thought processes:  linear and goal directed   Thought content: The patient is devoid of suicidal or homicidal ideation intent or plan. Devoid of auditory or visual hallucinations or other perceptual disturbances, there are no overt or covert signs of psychosis or paranoia. There are no neurovegetative signs of depression. Cognition:  oriented to person, place, and time   Concentration intact  Insight fair   Judgement fair     ASSESSMENT:   Patient symptoms are:  [x] Well controlled  [x] Improving  [] Worsening  [] No change      Diagnosis:   Principal Problem:    Major depressive disorder, single episode with mixed features  Resolved Problems:    * No resolved hospital problems.  *      LABS:    Recent Labs     09/01/21  1512   WBC 9.2   HGB 16.2        Recent Labs     09/01/21  1512      K 3.8      CO2 27   BUN 9   CREATININE 1.1   GLUCOSE 93     Recent Labs     09/01/21  1512   BILITOT 0.6   ALKPHOS 83   AST 16   ALT 20     Lab Results   Component Value Date    LABAMPH NOT DETECTED 09/01/2021    BARBSCNU NOT DETECTED 09/01/2021    LABBENZ NOT DETECTED 09/01/2021    LABMETH NOT DETECTED 09/01/2021    OPIATESCREENURINE NOT DETECTED 09/01/2021    PHENCYCLIDINESCREENURINE NOT DETECTED 09/01/2021    ETOH <10 09/01/2021     No results found for: TSH, FREET4  No results found for: LITHIUM  No results found for: VALPROATE, CBMZ        Treatment Plan:  The patient's diagnosis, treatment plan, medication management were formulated after patient was seen directly by the attending physician and myself and all relevant documentation was reviewed. Reviewed current Medications with the patient. Risk, benefit, side effects, possible outcomes of the medication and alternatives discussed with the patient and the patient demonstrated understanding. The patient was also educated that the outcome of treatment will depend on the medication compliance as directed by the prescribers along with regular follow-up, compliance with the labs and other work-up, as clinically indicated. Trileptal 150 mg twice daily for mood stability  Celexa 10 mg daily for depression and anxiety    Collateral information: Followed by social work  CD evaluation  Encourage patient to attend group and other milieu activities. Discharge planning discussed with the patient and treatment team.    PSYCHOTHERAPY/COUNSELING:  [x] Therapeutic interview  [x] Supportive  [] CBT  [] Ongoing  [] Other    [x] Patient continues to need, on a daily basis, active treatment furnished directly by or requiring the supervision of inpatient psychiatric personnel      Anticipated Length of stay: 3 to 5 days based on stability       NOTE: This report was transcribed using voice recognition software. Every effort was made to ensure accuracy; however, inadvertent computerized transcription errors may be present.     Electronically signed by ALIX Clemons CNP on 9/4/2021 at 12:28 PM

## 2021-09-04 NOTE — PROGRESS NOTES
Pt resting in his room reading a book. Pt states he had contacted his friend on social media and asked him if he could stop over. States he told him he was having a bad day. Pt denies ever wanting to kill self. States he started a new job and out of habit attempted to call his Mom (who  last year). Matthieu Duval it bothered him when he realized it. Pt denies SI, HI, and AVH. States he misses his GF who is an RN and his service dog and is anxious to go home. Pt states he is doing good today. Denies any issues.  Will continue to monitor

## 2021-09-04 NOTE — GROUP NOTE
Group Therapy Note    Date: 9/4/2021    Group Start Time: 1000  Group End Time: 0096  Group Topic: Psychoeducation    [facility]    Ellen López, CTRS        Group Therapy Note    Number of participants: 14  Type of group: Psychoeducation  Mode of intervention: Education, Support, Socialization, Exploration, Clarifying, and Problem-solving  Topic: Creating a Healthy Relationship with Technology   Objective: Pt will identify 1 way to maintain a healthy balance with technology in recovery. Patient's Goal:  \"Stay positive\"     Notes:  Pt interactive during group sharing 1 way to maintain a healthy relationship with technology in recovery. Pt gave support and feedback to others. Status After Intervention:  Improved    Participation Level:  Active Listener and Interactive    Participation Quality: Appropriate, Attentive, Sharing and Supportive      Speech:  normal      Thought Process/Content: Logical      Affective Functioning: Congruent      Mood: euthymic      Level of consciousness:  Alert, Oriented x4 and Attentive      Response to Learning: Able to verbalize current knowledge/experience, Able to verbalize/acknowledge new learning, Able to retain information, Capable of insight, Able to change behavior and Progressing to goal      Endings: None Reported    Modes of Intervention: Education, Support, Socialization, Exploration, Clarifying and Problem-solving

## 2021-09-04 NOTE — GROUP NOTE
Group Therapy Note    Date: 9/4/2021    Group Start Time: 1110  Group End Time: 1140  Group Topic: Group Therapy    [facility]    Ada Vigil [employer]        Group Therapy Note    Attendees: 10         Patient's Goal:  Pts will learn about the benefits of utilizing healthy coping strategies versus unhealthy coping strategies. Notes:  Pt was attentive and appropriate. Pt contributed to discussion about consequences of unhealthy coping. Status After Intervention:  Unchanged    Participation Level:  Active Listener and Interactive    Participation Quality: Appropriate, Attentive and Sharing      Speech:  normal      Thought Process/Content: Logical      Affective Functioning: Congruent      Mood: euthymic      Level of consciousness:  Alert, Oriented x4 and Attentive      Response to Learning: Able to verbalize current knowledge/experience and Able to retain information      Endings: None Reported    Modes of Intervention: Education, Support, Socialization, Exploration and Clarifying      Discipline Responsible: /Counselor      Signature:  [employer]

## 2021-09-04 NOTE — PLAN OF CARE
Denies SI/HI  denies hallucinations  out on the unit social with select peers   affect brighter  attending groups  cooperative with meds  states he feels better on the meds  will continue to monitor

## 2021-09-05 VITALS
RESPIRATION RATE: 14 BRPM | DIASTOLIC BLOOD PRESSURE: 77 MMHG | HEART RATE: 56 BPM | WEIGHT: 160 LBS | SYSTOLIC BLOOD PRESSURE: 130 MMHG | TEMPERATURE: 98.8 F | OXYGEN SATURATION: 100 % | HEIGHT: 66 IN | BODY MASS INDEX: 25.71 KG/M2

## 2021-09-05 PROCEDURE — 6370000000 HC RX 637 (ALT 250 FOR IP): Performed by: NURSE PRACTITIONER

## 2021-09-05 PROCEDURE — 99239 HOSP IP/OBS DSCHRG MGMT >30: CPT | Performed by: NURSE PRACTITIONER

## 2021-09-05 RX ORDER — NICOTINE 21 MG/24HR
1 PATCH, TRANSDERMAL 24 HOURS TRANSDERMAL DAILY
Qty: 30 PATCH | Refills: 3
Start: 2021-09-05

## 2021-09-05 RX ORDER — OXCARBAZEPINE 150 MG/1
150 TABLET, FILM COATED ORAL 2 TIMES DAILY
Qty: 60 TABLET | Refills: 0 | Status: SHIPPED | OUTPATIENT
Start: 2021-09-05 | End: 2021-10-05

## 2021-09-05 RX ORDER — CITALOPRAM 10 MG/1
10 TABLET ORAL DAILY
Qty: 30 TABLET | Refills: 0 | Status: SHIPPED | OUTPATIENT
Start: 2021-09-05 | End: 2021-10-05

## 2021-09-05 RX ADMIN — OXCARBAZEPINE 150 MG: 300 TABLET, FILM COATED ORAL at 08:55

## 2021-09-05 RX ADMIN — CITALOPRAM HYDROBROMIDE 10 MG: 20 TABLET ORAL at 08:55

## 2021-09-05 NOTE — PROGRESS NOTES
[id number]    Pt attended and participated in leisure group of [employer]. Pt was 1 out of 7 in attendance.

## 2021-09-05 NOTE — SUICIDE SAFETY PLAN
SAFETY PLAN    A suicide Safety Plan is a document that supports someone when they are having thoughts of suicide. Warning Signs that indicate a suicidal crisis may be developing: What (situations, thoughts, feelings, body sensations, behaviors, etc.) do you experience that lets you know you are beginning to think about suicide? Pt denies history of SI        Internal Coping Strategies:  What things can I do (relaxation techniques, hobbies, physical activities, etc.) to take my mind off my problems without contacting another person? 1. Work on my car  2. Walk dog  3. [place] out with friends    People and social settings that provide distraction: Who can I call or where can I go to distract me? 1. Name: [place] (best friend) [phone]  2. Name: Gerardo Plata  [phone]    3. Place: [place]   (in my phone)             People whom I can ask for help: Who can I call when I need help - for example, friends, family, clergy, someone else? 1. Name: [place]              [phone]  2. Name: Gerardo Plata  Phone: [phone]       Professionals or "[employer] agencies I can contact during a crisis: Who can I call for help - for example, my doctor, my psychiatrist, my psychologist, a mental health provider, a suicide hotline? 1. Clinician Name: Pernell Canas   Phone: ( in pt's phone)          2. Clinician Name: Marilia   Phone: (in pt's phone))          3. Suicide Prevention Lifeline: 0-478-082-TALK (8041)    4. [address] [place] [address] Emergency Services -  for example, [facility] suicide hotline, [facility] Hotline: 271              Making the environment safe: How can I make my environment (house/apartment/living space) safer? For example, can I remove guns, medications, and other items?       Pt denies any past or present suicidal ideations

## 2021-09-05 NOTE — PROGRESS NOTES
[facility]  Discharge Note    Pt discharged with followings belongings:   Dentures: None  Vision - Corrective Lenses: None  Hearing Aid: None  Jewelry: None  Body Piercings Removed: No  Clothing: Footwear, Pants, Shirt (blue tennis shoes, green baseball cap, black hoodie, light brown pants, grey t-shirt)  Were All Patient Medications Collected?: Not Applicable  Other Valuables: None   Valuables  returned to patient. Patient education on aftercare instructions: . Patient verbalize understanding of AVS:  .    Status EXAM upon discharge:  Status and Exam  Normal: Yes  Facial Expression: Brightened  Affect: Appropriate  Level of Consciousness: Alert  Mood:Normal: No  Mood: Anxious  Motor Activity:Normal: Yes  Motor Activity: Decreased  Interview Behavior: Cooperative  Preception: Lamar to Person, Margaret Melissa to Time, Lamar to Place, Lamar to Situation  Attention:Normal: Yes  Attention:  (able to focus)  Thought Processes: Circumstantial  Thought Content:Normal: Yes  Thought Content: Preoccupations  Hallucinations: None  Delusions: No  Memory:Normal: Yes  Insight and Judgment: No  Insight and Judgment: Poor Judgment, Poor Insight  Present Suicidal Ideation: No  Present Homicidal Ideation: No      Metabolic Screening:    Lab Results   Component Value Date    LABA1C 5.0 09/02/2021       Lab Results   Component Value Date    CHOL 130 09/02/2021     Lab Results   Component Value Date    TRIG 77 09/02/2021     Lab Results   Component Value Date    HDL 48 09/02/2021     No components found for: [facility] EVALUATION AND TREATMENT [place]  Lab Results   Component Value Date    LABVLDL 15 09/02/2021       Shannan Burger RN

## 2021-09-05 NOTE — GROUP NOTE
Group Therapy Note    Date: 9/5/2021    Group Start Time: 1000  Group End Time: 4880  Group Topic: Psychoeducation    [facility]    Ellen López, CTRS        Group Therapy Note      Number of participants: 15  Type of group: Psychoeducation  Mode of intervention: Education, Support, Socialization, Exploration, Clarifying, and Problem-solving  Topic: A Mindfulness Response to Thoughts: APPLE   Objective: Pt will identify ways to acknowledge, pause, pull back, let go, and explore (APPLE) in recovery. Patient's Goal:  \"utilize the skills I have learned\"     Notes:   Pt was interactive during group sharing ways to acknowledge, pause, pull back, let go, and explore in recovery. Pt gave support and feedback to others. Status After Intervention:  Improved    Participation Level:  Active Listener and Interactive    Participation Quality: Appropriate, Attentive, Sharing and Supportive      Speech:  normal      Thought Process/Content: Logical      Affective Functioning: Congruent      Mood: euthymic      Level of consciousness:  Alert, Oriented x4 and Attentive      Response to Learning: Able to verbalize current knowledge/experience, Able to verbalize/acknowledge new learning, Able to retain information, Capable of insight, Able to change behavior and Progressing to goal      Endings: None Reported    Modes of Intervention: Education, Support, Socialization, Exploration, Clarifying and Problem-solving

## 2021-09-05 NOTE — GROUP NOTE
Group Therapy Note    Date: 9/5/2021    Group Start Time: 1100  Group End Time: 1130  Group Topic: Group Therapy    [facility]    [facility], [address]        Group Therapy Note    Attendees: 11         Patient's Goal:  Pt will learn about healthy coping skills such as behavioral activation and mindfulness. Notes: Pt was appropriate and attentive throughout the group. Pt contributed feedback and questions during discussion. Status After Intervention:  Unchanged    Participation Level:  Active Listener and Interactive    Participation Quality: Appropriate, Attentive and Sharing      Speech:  normal      Thought Process/Content: Logical      Affective Functioning: Congruent      Mood: euthymic      Level of consciousness:  Alert, Oriented x4 and Attentive      Response to Learning: Able to verbalize/acknowledge new learning and Able to retain information      Endings: None Reported    Modes of Intervention: Education, Support, Socialization, Exploration and Clarifying      Discipline Responsible: /Counselor      Signature:  GAIL Ferrari, [address]

## 2021-09-05 NOTE — DISCHARGE SUMMARY
DISCHARGE SUMMARY      Patient ID:  Scottie Kurtz  [id number]  23 y.o.  2000    Admit date: 9/1/2021    Discharge date and time: 9/5/2021    Admitting Physician: Nataly Brewer MD     Discharge Physician: Dr Woo Mendez MD    Discharge Diagnoses:   Patient Active Problem List   Diagnosis    Major depressive disorder, single episode with mixed features       Admission Condition: poor    Discharged Condition: stable    Admission Circumstance:   presented to ED via EMS pink slip by police after he made remarks on staff chat that he not want to live any longer. PAST MEDICAL/PSYCHIATRIC HISTORY:   History reviewed. No pertinent past medical history. FAMILY/SOCIAL HISTORY:  History reviewed. No pertinent family history. Social History     Socioeconomic History    Marital status: Single     Spouse name: Not on file    Number of children: Not on file    Years of education: 15    Highest education level: Not on file   Occupational History    Not on file   Tobacco Use    Smoking status: Current Every Day Smoker     Packs/day: 0.50    Smokeless tobacco: Never Used   Vaping Use    Vaping Use: Every day   Substance and Sexual Activity    Alcohol use: Not Currently    Drug use: Yes     Types: Marijuana    Sexual activity: Yes     Partners: Female   Other Topics Concern    Not on file   Social History Narrative    Not on file     Social Determinants of Health     Financial Resource Strain:     Difficulty of Paying Living Expenses:    Food Insecurity:     Worried About Running Out of Food in the Last Year:     Ran Out of Food in the Last Year:    Transportation Needs:     Lack of Transportation (Medical):      Lack of Transportation (Non-Medical):    Physical Activity:     Days of Exercise per Week:     Minutes of Exercise per Session:    Stress:     Feeling of Stress :    Social Connections:     Frequency of Communication with Friends and Family:     Frequency of Social Gatherings with Friends and Family:     Attends Orthodox Services:     Active Member of Clubs or Organizations:     Attends Club or Organization Meetings:     Marital Status:    Intimate Partner Violence:     Fear of Current or Ex-Partner:     Emotionally Abused:     Physically Abused:     Sexually Abused:        MEDICATIONS:    Current Facility-Administered Medications:     citalopram (CELEXA) tablet 10 mg, 10 mg, Oral, Daily, Paul Oats, APRN - CNP, 10 mg at 09/04/21 4557    OXcarbazepine (TRILEPTAL) tablet 150 mg, 150 mg, Oral, BID, Jesica Konig Dellick, APRN - CNP, 082 mg at 09/04/21 2225    melatonin disintegrating tablet 5 mg, 5 mg, Oral, Nightly, Jesica Konig Dellick, APRN - CNP, 5 mg at 09/04/21 2225    acetaminophen (TYLENOL) tablet 650 mg, 650 mg, Oral, Q6H PRN, Weston Mejia MD    magnesium hydroxide (MILK OF MAGNESIA) 400 MG/5ML suspension 30 mL, 30 mL, Oral, Daily PRN, Weston Mejia MD    nicotine (NICODERM CQ) 21 MG/24HR 1 patch, 1 patch, TransDERmal, Daily, Weston Mejia MD    aluminum & magnesium hydroxide-simethicone (MAALOX) 200-200-20 MG/5ML suspension 30 mL, 30 mL, Oral, PRN, Weston Mejia MD    hydrOXYzine (VISTARIL) capsule 50 mg, 50 mg, Oral, TID PRN, Weston Mejia MD    haloperidol (HALDOL) tablet 5 mg, 5 mg, Oral, Q6H PRN **OR** haloperidol lactate (HALDOL) injection 5 mg, 5 mg, IntraMUSCular, Q6H PRN, Weston Mejia MD    traZODone (DESYREL) tablet 50 mg, 50 mg, Oral, Nightly PRN, Weston Mejia MD    Examination:  /77   Pulse 56   Temp 98.8 °F (37.1 °C)   Resp 14   Ht 5' 6\" (1.676 m)   Wt 160 lb (72.6 kg)   SpO2 100%   BMI 25.82 kg/m²   Gait - steady    HOSPITAL COURSE[de-identified]  Following admission to the hospital, patient had a complete physical exam and blood work up, which he was medically cleared and admitted to [facility] for psychiatric evaluation and stabilization. The patient was monitored closely with suicide and appropriate precautions.   He was started on Celexa 10 mg daily for depression and anxiety, Trileptal 150 mg twice daily for mood stabilization. He was encouraged to participate in group and other milieu activity and started to feel better with this combination of treatment. There has been significant progress in the improvement of symptoms since admission. The patient has been an active participant in his treatment, and discharge planning. Patient was no longer suicidal, homicidal, manic or psychotic. He received the required treatment with medication, participated in group milieu, remained engaged in unit activities, learned appropriate coping skills. He was seen to be watching television socializing with peers using the phone. There were no mention or gestures of self-harm or harm to others. His mental status has returned to baseline. The treatment team believes the patient obtain maximum benefit out of this hospitalization and does not meet the criteria for inpatient hospitalization anymore. However he will continue to benefit from outpatient follow-up and treatment to maintain stability. Collateral information has been obtained and reconciled and there are no concerns about his safety. He has no access to guns or weapons. He appreciates the help that he received here. This patient no longer meets criteria for inpatient hospitalization. He was discharged home to his family in psychiatrically stable condition. Appetite:  [x] Normal  [] Increased  [] Decreased    Sleep:       [x] Normal  [] Fair       [] Poor            Energy:    [x] Normal  [] Increased  [] Decreased     SI [] Present  [x] Absent  HI  []Present  [x] Absent   Aggression:  [] yes  [] no  Patient is [x] able  [] unable to CONTRACT FOR SAFETY   Medication side effects(SE):  [x] None(Psych.  Meds.) [] Other      Mental Status Examination on discharge:    Level of consciousness:  within normal limits   Appearance:  well-appearing  Behavior/Motor:  no abnormalities noted  Attitude toward examiner:  attentive and good eye contact  Speech:  spontaneous, normal rate and normal volume   Mood: euthymic  Affect:  mood congruent  Thought processes:  linear and goal directed   Thought content: The patient is devoid of suicidal or homicidal ideation intent or plan. Devoid of auditory or visual hallucinations or other perceptual disturbances, there are no overt or covert signs of psychosis or paranoia. There are no neurovegetative signs of depression. Cognition:  oriented to person, place, and time   Concentration intact  Memory intact  Insight good   Judgement fair   Fund of Knowledge adequate      ASSESSMENT:  Patient symptoms are:  [x] Well controlled  [x] Improving  [] Worsening  [] No change    Reason for more than one antipsychotic:  [x] N/A  [] 3 Failed Monotherapy attempts (Drugs tried:)  [] Crossover to a new antipsychotic  [] Taper to Monotherapy from Polypharmacy  [] Augmentation of clozapine therapy due to treatment resistance to single therapy    Diagnosis:  Principal Problem:    Major depressive disorder, single episode with mixed features  Resolved Problems:    * No resolved hospital problems. *      LABS:    No results for input(s): WBC, HGB, PLT in the last 72 hours. No results for input(s): NA, K, CL, CO2, BUN, CREATININE, GLUCOSE in the last 72 hours. No results for input(s): BILITOT, ALKPHOS, AST, ALT in the last 72 hours. Lab Results   Component Value Date    LABAMPH NOT DETECTED 09/01/2021    BARBSCNU NOT DETECTED 09/01/2021    LABBENZ NOT DETECTED 09/01/2021    LABMETH NOT DETECTED 09/01/2021    OPIATESCREENURINE NOT DETECTED 09/01/2021    PHENCYCLIDINESCREENURINE NOT DETECTED 09/01/2021    ETOH <10 09/01/2021     No results found for: TSH, FREET4  No results found for: LITHIUM  No results found for: VALPROATE, CBMZ    RISK ASSESSMENT AT DISCHARGE: Low risk for suicide and homicide.      Treatment Plan:  The patient's diagnosis, treatment plan, medication management were formulated after patient was seen directly by the attending physician and myself and all relevant documentation was reviewed. The patient was referred to outpatient/inpatient substance abuse rehabilitation programming. He was educated multiple times during the hospitalization that if he chooses to continue to use drugs or alcohol, he may potentially act out impulsively, resulting in serious harm to self or others, even though unintentional.  He was also educated that mental health treatment cannot be optimized with ongoing use of drugs. He demonstrated understanding has the capacity to understand that. Risk, benefit, side effects, possible outcomes of the medication and alternatives discussed with the patient and the patient demonstrated understanding. The patient was also educated that the outcome of treatment will depend on the medication compliance as directed by the prescribers along with regular follow-up, compliance with the labs and other work-up, as clinically indicated. Encourage patient to attend outpatient follow up appointment and therapy, outpatient follow-up appointments have been scheduled prior to discharge. Patient was advised to call the outpatient provider, visit the nearest ED or call 911 if symptoms are not manageable. Patient's family member was contacted prior to the discharge, patient has been discharged home in psychiatrically stable condition.          Medication List      START taking these medications    citalopram 10 MG tablet  Commonly known as: CELEXA  Take 1 tablet by mouth daily     nicotine 21 MG/24HR  Commonly known as: [id number] [facility] 1 patch onto the skin daily     OXcarbazepine 150 MG tablet  Commonly known as: TRILEPTAL  Take 1 tablet by mouth 2 times daily           Where to Get Your Medications      You can get these medications from any pharmacy    Bring a paper prescription for each of these medications  · citalopram 10 MG tablet  · OXcarbazepine 150 MG tablet     Information about where to get these medications is not yet available    Ask your nurse or doctor about these medications  · nicotine 21 MG/24HR       NOTE: This report was transcribed using voice recognition software. Every effort was made to ensure accuracy; however, inadvertent computerized transcription errors may be present.     TIME SPEND - 35 MINUTES TO COMPLETE THE EVALUATION, DISCHARGE SUMMARY, MEDICATION RECONCILIATION AND FOLLOW UP CARE     Signed:  ALIX Santillan - CNP  9/5/2021  8:34 AM

## 2021-09-05 NOTE — PROGRESS NOTES
Pt alert, calm, and cooperative. Out on the unit coloring with peer and eating snack. Pt denies SI, HI, and AVH. Pt states he had a good day, \"just missing my dog\". Pt states his GF said the dog isnt eating very well as it is upset that pt isnt around. States he hasnt left his service dog in 4 years. Pt is hoping for discharge. Pt is med compliant and attending groups.  Will continue to monitor

## 2021-09-05 NOTE — CARE COORDINATION
CARLOS EDUARDO has made two attempts yesterday and today to contact pt's provider Donnalee Blizzard. They are out of the office presumably for the weekend/holiday. As the pt is being discharged, I documented that he needs to schedule a follow up appointment immediately once the office is opened. This will be part of his discharge paperwork. CARLOS EDUARDO will leave a note for SW to follow up during the week.

## 2021-09-07 NOTE — CARE COORDINATION
Pt has a medication management appointment scheduled 9/30 at 9:30AM at the [facility] office, SW discussed setting pt up for counseling services sooner, but a referral would need to be made. AI LVM for RN to set pt up with counseling appointment/ initiate a referral for the pt. AI following. AI called the pt to inform him of his appointment that will be at the [place] office. Pt stated that he will call and speak with the counseling center to get set up with a counselor.